# Patient Record
Sex: MALE | Race: WHITE
[De-identification: names, ages, dates, MRNs, and addresses within clinical notes are randomized per-mention and may not be internally consistent; named-entity substitution may affect disease eponyms.]

---

## 2020-09-17 ENCOUNTER — HOSPITAL ENCOUNTER (OUTPATIENT)
Dept: HOSPITAL 19 - SDCO | Age: 25
Discharge: HOME | End: 2020-09-17
Attending: UROLOGY
Payer: OTHER GOVERNMENT

## 2020-09-17 VITALS — HEART RATE: 62 BPM | DIASTOLIC BLOOD PRESSURE: 61 MMHG | SYSTOLIC BLOOD PRESSURE: 112 MMHG

## 2020-09-17 VITALS — DIASTOLIC BLOOD PRESSURE: 70 MMHG | SYSTOLIC BLOOD PRESSURE: 116 MMHG | HEART RATE: 79 BPM

## 2020-09-17 VITALS — DIASTOLIC BLOOD PRESSURE: 65 MMHG | HEART RATE: 75 BPM | SYSTOLIC BLOOD PRESSURE: 110 MMHG | TEMPERATURE: 97.2 F

## 2020-09-17 VITALS — SYSTOLIC BLOOD PRESSURE: 109 MMHG | DIASTOLIC BLOOD PRESSURE: 66 MMHG | TEMPERATURE: 98.6 F | HEART RATE: 51 BPM

## 2020-09-17 VITALS — DIASTOLIC BLOOD PRESSURE: 71 MMHG | HEART RATE: 58 BPM | SYSTOLIC BLOOD PRESSURE: 115 MMHG | TEMPERATURE: 96.5 F

## 2020-09-17 VITALS — BODY MASS INDEX: 19.03 KG/M2 | WEIGHT: 118.39 LBS | HEIGHT: 66 IN

## 2020-09-17 VITALS — HEART RATE: 74 BPM | SYSTOLIC BLOOD PRESSURE: 101 MMHG | DIASTOLIC BLOOD PRESSURE: 67 MMHG

## 2020-09-17 DIAGNOSIS — C62.92: Primary | ICD-10-CM

## 2020-09-17 DIAGNOSIS — Z20.828: ICD-10-CM

## 2020-09-17 PROCEDURE — L8699 PROSTHETIC IMPLANT NOS: HCPCS

## 2020-09-17 NOTE — NUR
Pt. has met discharge criteria.  Pt. given discharge scripts, education,
discharge instructions, and health summary.  Pt. voices understanding.  INT
discontiued from rt. forearm.  Pt. dressed and escorted out by CNA.

## 2020-09-17 NOTE — NUR
Pt. to the floor from PACU.  Pt. is A&OX3, assessment complete.  IV to rt.
forearm patent, IV fluids infusing.  Pt. denies pain at this time. Incision to
lt. low abd. noted, edges well approximated, with lieberman set.  Pt. denies pain
or other needs, Vital stable at this time.